# Patient Record
Sex: FEMALE | Race: ASIAN | NOT HISPANIC OR LATINO
[De-identification: names, ages, dates, MRNs, and addresses within clinical notes are randomized per-mention and may not be internally consistent; named-entity substitution may affect disease eponyms.]

---

## 2017-01-23 ENCOUNTER — RESULT REVIEW (OUTPATIENT)
Age: 57
End: 2017-01-23

## 2017-05-15 ENCOUNTER — OTHER (OUTPATIENT)
Age: 57
End: 2017-05-15

## 2017-06-28 ENCOUNTER — FORM ENCOUNTER (OUTPATIENT)
Age: 57
End: 2017-06-28

## 2017-06-29 ENCOUNTER — APPOINTMENT (OUTPATIENT)
Dept: ULTRASOUND IMAGING | Facility: CLINIC | Age: 57
End: 2017-06-29

## 2017-06-29 ENCOUNTER — OUTPATIENT (OUTPATIENT)
Dept: OUTPATIENT SERVICES | Facility: HOSPITAL | Age: 57
LOS: 1 days | End: 2017-06-29
Payer: COMMERCIAL

## 2017-06-29 ENCOUNTER — APPOINTMENT (OUTPATIENT)
Dept: MAMMOGRAPHY | Facility: CLINIC | Age: 57
End: 2017-06-29

## 2017-06-29 DIAGNOSIS — Z85.3 PERSONAL HISTORY OF MALIGNANT NEOPLASM OF BREAST: ICD-10-CM

## 2017-06-29 PROCEDURE — 76641 ULTRASOUND BREAST COMPLETE: CPT

## 2017-06-29 PROCEDURE — 77067 SCR MAMMO BI INCL CAD: CPT

## 2017-06-29 PROCEDURE — 77063 BREAST TOMOSYNTHESIS BI: CPT

## 2017-11-02 ENCOUNTER — OTHER (OUTPATIENT)
Age: 57
End: 2017-11-02

## 2017-11-20 ENCOUNTER — FORM ENCOUNTER (OUTPATIENT)
Age: 57
End: 2017-11-20

## 2017-11-21 ENCOUNTER — OUTPATIENT (OUTPATIENT)
Dept: OUTPATIENT SERVICES | Facility: HOSPITAL | Age: 57
LOS: 1 days | End: 2017-11-21
Payer: COMMERCIAL

## 2017-11-21 ENCOUNTER — APPOINTMENT (OUTPATIENT)
Dept: MRI IMAGING | Facility: CLINIC | Age: 57
End: 2017-11-21
Payer: COMMERCIAL

## 2017-11-21 DIAGNOSIS — Z00.8 ENCOUNTER FOR OTHER GENERAL EXAMINATION: ICD-10-CM

## 2017-11-21 PROCEDURE — A9585: CPT

## 2017-11-21 PROCEDURE — C8908: CPT

## 2017-11-21 PROCEDURE — C8937: CPT

## 2017-11-21 PROCEDURE — 77059 MRI BREAST BILATERAL: CPT | Mod: 26

## 2017-11-21 PROCEDURE — 0159T: CPT | Mod: 26

## 2017-12-12 ENCOUNTER — RESULT REVIEW (OUTPATIENT)
Age: 57
End: 2017-12-12

## 2018-02-12 ENCOUNTER — APPOINTMENT (OUTPATIENT)
Dept: BREAST CENTER | Facility: CLINIC | Age: 58
End: 2018-02-12
Payer: COMMERCIAL

## 2018-02-12 VITALS
DIASTOLIC BLOOD PRESSURE: 70 MMHG | BODY MASS INDEX: 22.08 KG/M2 | HEIGHT: 62 IN | SYSTOLIC BLOOD PRESSURE: 108 MMHG | WEIGHT: 120 LBS | HEART RATE: 68 BPM

## 2018-02-12 DIAGNOSIS — Z13.79 ENCOUNTER FOR OTHER SCREENING FOR GENETIC AND CHROMOSOMAL ANOMALIES: ICD-10-CM

## 2018-02-12 PROCEDURE — 99214 OFFICE O/P EST MOD 30 MIN: CPT

## 2018-02-12 RX ORDER — ASCORBIC ACID 500 MG
TABLET ORAL
Refills: 0 | Status: DISCONTINUED | COMMUNITY

## 2018-07-15 ENCOUNTER — FORM ENCOUNTER (OUTPATIENT)
Age: 58
End: 2018-07-15

## 2018-07-16 ENCOUNTER — APPOINTMENT (OUTPATIENT)
Dept: MAMMOGRAPHY | Facility: CLINIC | Age: 58
End: 2018-07-16
Payer: COMMERCIAL

## 2018-07-16 ENCOUNTER — APPOINTMENT (OUTPATIENT)
Dept: ULTRASOUND IMAGING | Facility: CLINIC | Age: 58
End: 2018-07-16
Payer: COMMERCIAL

## 2018-07-16 ENCOUNTER — OUTPATIENT (OUTPATIENT)
Dept: OUTPATIENT SERVICES | Facility: HOSPITAL | Age: 58
LOS: 1 days | End: 2018-07-16
Payer: COMMERCIAL

## 2018-07-16 DIAGNOSIS — Z00.8 ENCOUNTER FOR OTHER GENERAL EXAMINATION: ICD-10-CM

## 2018-07-16 PROCEDURE — 77067 SCR MAMMO BI INCL CAD: CPT

## 2018-07-16 PROCEDURE — 76641 ULTRASOUND BREAST COMPLETE: CPT | Mod: 26,50

## 2018-07-16 PROCEDURE — 76641 ULTRASOUND BREAST COMPLETE: CPT

## 2018-07-16 PROCEDURE — 77063 BREAST TOMOSYNTHESIS BI: CPT | Mod: 26

## 2018-07-16 PROCEDURE — 77063 BREAST TOMOSYNTHESIS BI: CPT

## 2018-07-16 PROCEDURE — 77067 SCR MAMMO BI INCL CAD: CPT | Mod: 26

## 2018-11-25 ENCOUNTER — FORM ENCOUNTER (OUTPATIENT)
Age: 58
End: 2018-11-25

## 2018-11-26 ENCOUNTER — APPOINTMENT (OUTPATIENT)
Dept: MRI IMAGING | Facility: CLINIC | Age: 58
End: 2018-11-26
Payer: COMMERCIAL

## 2018-11-26 ENCOUNTER — OUTPATIENT (OUTPATIENT)
Dept: OUTPATIENT SERVICES | Facility: HOSPITAL | Age: 58
LOS: 1 days | End: 2018-11-26
Payer: COMMERCIAL

## 2018-11-26 DIAGNOSIS — Z00.8 ENCOUNTER FOR OTHER GENERAL EXAMINATION: ICD-10-CM

## 2018-11-26 PROCEDURE — C8908: CPT

## 2018-11-26 PROCEDURE — C8937: CPT

## 2018-11-26 PROCEDURE — A9585: CPT

## 2018-11-26 PROCEDURE — 77059 MRI BREAST BILATERAL: CPT | Mod: 26

## 2018-11-26 PROCEDURE — 82565 ASSAY OF CREATININE: CPT

## 2018-11-26 PROCEDURE — 0159T: CPT | Mod: 26

## 2019-04-25 NOTE — PAST MEDICAL HISTORY
[Postmenopausal] : The patient is postmenopausal [Menarche Age ____] : age at menarche was [unfilled] [Menopause Age____] : age at menopause was [unfilled] [Total Preg ___] : G[unfilled] [Live Births ___] : P[unfilled]

## 2019-04-29 ENCOUNTER — APPOINTMENT (OUTPATIENT)
Dept: BREAST CENTER | Facility: CLINIC | Age: 59
End: 2019-04-29
Payer: COMMERCIAL

## 2019-04-29 VITALS
SYSTOLIC BLOOD PRESSURE: 138 MMHG | HEIGHT: 61.5 IN | DIASTOLIC BLOOD PRESSURE: 81 MMHG | WEIGHT: 118 LBS | HEART RATE: 68 BPM | BODY MASS INDEX: 21.99 KG/M2

## 2019-04-29 DIAGNOSIS — D05.10 INTRADUCTAL CARCINOMA IN SITU OF UNSPECIFIED BREAST: ICD-10-CM

## 2019-04-29 DIAGNOSIS — Z85.3 PERSONAL HISTORY OF MALIGNANT NEOPLASM OF BREAST: ICD-10-CM

## 2019-04-29 DIAGNOSIS — Z92.89 PERSONAL HISTORY OF OTHER MEDICAL TREATMENT: ICD-10-CM

## 2019-04-29 PROCEDURE — 99214 OFFICE O/P EST MOD 30 MIN: CPT

## 2019-04-29 RX ORDER — BIOTIN 10 MG
TABLET ORAL
Refills: 0 | Status: DISCONTINUED | COMMUNITY
End: 2019-04-29

## 2019-04-29 NOTE — PHYSICAL EXAM
[Atraumatic] : atraumatic [Normocephalic] : normocephalic [EOMI] : extra ocular movement intact [Supple] : supple [Sclera nonicteric] : sclera nonicteric [No Supraclavicular Adenopathy] : no supraclavicular adenopathy [No Cervical Adenopathy] : no cervical adenopathy [Clear to Auscultation Bilat] : clear to auscultation bilaterally [Normal Sinus Rhythm] : normal sinus rhythm [No Rubs] : no pericardial rub [Examined in the supine and seated position] : examined in the supine and seated position [Symmetrical] : symmetrical [No dominant masses] : no dominant masses left breast [No dominant masses] : no dominant masses in right breast  [No Nipple Retraction] : no left nipple retraction [No Nipple Discharge] : no right nipple discharge [No Axillary Lymphadenopathy] : no left axillary lymphadenopathy [Not Tender] : non-tender [Soft] : abdomen soft [No Edema] : no edema [No Ulceration] : no ulceration [No Rashes] : no rashes [de-identified] : Circumareolar scar 12:00.

## 2019-04-29 NOTE — DATA REVIEWED
[FreeTextEntry1] : Core needle bx 12/21/2005\par - R breast 1:00 = fibrocystic changes\par \par Core needle bx 7/6/2006\par - R breast 9:00 = fibrocystic changes\par \par Surg path 1/20/2011\par - L lumpectomy = no residual DCIS\par [needle biopsy report of being moderate-high grade, ER >95%, ID 35-40%]\par \par B/l mammogram and US 7/16/2018\par - heterogenously dense\par - stable post-lumpectomy changes in L breast\par - BIRADS 2\par \par Breast MRI 11/26/2018\par - stable post-lumpectomy changes in L breast\par - no lymphadenopathy\par - BIRADS 2

## 2019-04-29 NOTE — CONSULT LETTER
[Dear  ___] : Dear  [unfilled], [Courtesy Letter:] : I had the pleasure of seeing your patient, [unfilled], in my office today. [Please see my note below.] : Please see my note below. [Consult Closing:] : Thank you very much for allowing me to participate in the care of this patient.  If you have any questions, please do not hesitate to contact me. [Sincerely,] : Sincerely, [FreeTextEntry3] : Hina Kauffman MD

## 2019-04-29 NOTE — HISTORY OF PRESENT ILLNESS
[FreeTextEntry1] : Ms. Davis is a 59 year old woman here for left breast pain and for surveillance for breast cancer. Her breast history is significant for :\par \par 1. Left breast DCIS diagnosed in 2011 at age 50, stage 0, ER >95%, NE 35-40%\par - found as microcalcifications on mammogram and enhancement on MRI\par - s/p left lumpectomy (Dr. Collier, 2011)\par - s/p radiation\par - declined tamoxifen \par \par The pain is in the left lateral breast, intermittent, does not last long, and resolves spontaneously. No redness or drainage. No pain today. She has been very stressed with work recently. No palpable breast or axillary lumps, nipple discharge, or skin changes. Her mammogram and ultrasound in July and breast MRI in November were benign.\par \par Her panel testing is negative for any mutations. Her family history is significant for breast cancer in her sister at age 49 ( from it at age 67), a maternal aunt in her 40-50's, and a maternal cousin in her 40's.

## 2019-07-17 ENCOUNTER — FORM ENCOUNTER (OUTPATIENT)
Age: 59
End: 2019-07-17

## 2019-07-18 ENCOUNTER — OUTPATIENT (OUTPATIENT)
Dept: OUTPATIENT SERVICES | Facility: HOSPITAL | Age: 59
LOS: 1 days | End: 2019-07-18
Payer: COMMERCIAL

## 2019-07-18 ENCOUNTER — APPOINTMENT (OUTPATIENT)
Dept: MAMMOGRAPHY | Facility: CLINIC | Age: 59
End: 2019-07-18
Payer: COMMERCIAL

## 2019-07-18 ENCOUNTER — APPOINTMENT (OUTPATIENT)
Dept: ULTRASOUND IMAGING | Facility: CLINIC | Age: 59
End: 2019-07-18
Payer: COMMERCIAL

## 2019-07-18 DIAGNOSIS — Z00.8 ENCOUNTER FOR OTHER GENERAL EXAMINATION: ICD-10-CM

## 2019-07-18 PROCEDURE — 77062 BREAST TOMOSYNTHESIS BI: CPT | Mod: 26

## 2019-07-18 PROCEDURE — 77066 DX MAMMO INCL CAD BI: CPT | Mod: 26

## 2019-07-18 PROCEDURE — 77067 SCR MAMMO BI INCL CAD: CPT

## 2019-07-18 PROCEDURE — G0279: CPT

## 2019-07-18 PROCEDURE — 77066 DX MAMMO INCL CAD BI: CPT

## 2019-07-18 PROCEDURE — 76641 ULTRASOUND BREAST COMPLETE: CPT | Mod: 26,50

## 2019-07-18 PROCEDURE — 77063 BREAST TOMOSYNTHESIS BI: CPT

## 2019-07-18 PROCEDURE — 76641 ULTRASOUND BREAST COMPLETE: CPT

## 2019-12-25 ENCOUNTER — FORM ENCOUNTER (OUTPATIENT)
Age: 59
End: 2019-12-25

## 2019-12-26 ENCOUNTER — APPOINTMENT (OUTPATIENT)
Dept: MRI IMAGING | Facility: CLINIC | Age: 59
End: 2019-12-26
Payer: COMMERCIAL

## 2019-12-26 ENCOUNTER — OUTPATIENT (OUTPATIENT)
Dept: OUTPATIENT SERVICES | Facility: HOSPITAL | Age: 59
LOS: 1 days | End: 2019-12-26
Payer: COMMERCIAL

## 2019-12-26 DIAGNOSIS — Z80.3 FAMILY HISTORY OF MALIGNANT NEOPLASM OF BREAST: ICD-10-CM

## 2019-12-26 DIAGNOSIS — R92.2 INCONCLUSIVE MAMMOGRAM: ICD-10-CM

## 2019-12-26 DIAGNOSIS — Z85.3 PERSONAL HISTORY OF MALIGNANT NEOPLASM OF BREAST: ICD-10-CM

## 2019-12-26 PROCEDURE — C8937: CPT

## 2019-12-26 PROCEDURE — A9585: CPT

## 2019-12-26 PROCEDURE — C8908: CPT

## 2019-12-26 PROCEDURE — 77049 MRI BREAST C-+ W/CAD BI: CPT | Mod: 26

## 2020-02-27 ENCOUNTER — APPOINTMENT (OUTPATIENT)
Dept: BREAST CENTER | Facility: CLINIC | Age: 60
End: 2020-02-27
Payer: COMMERCIAL

## 2020-02-27 VITALS
HEIGHT: 61.5 IN | HEART RATE: 71 BPM | WEIGHT: 120 LBS | DIASTOLIC BLOOD PRESSURE: 80 MMHG | BODY MASS INDEX: 22.37 KG/M2 | SYSTOLIC BLOOD PRESSURE: 125 MMHG

## 2020-02-27 DIAGNOSIS — R92.2 INCONCLUSIVE MAMMOGRAM: ICD-10-CM

## 2020-02-27 DIAGNOSIS — N64.4 MASTODYNIA: ICD-10-CM

## 2020-02-27 PROCEDURE — 99214 OFFICE O/P EST MOD 30 MIN: CPT

## 2020-02-27 NOTE — PHYSICAL EXAM
[Normocephalic] : normocephalic [EOMI] : extra ocular movement intact [Atraumatic] : atraumatic [Sclera nonicteric] : sclera nonicteric [Supple] : supple [No Supraclavicular Adenopathy] : no supraclavicular adenopathy [No Cervical Adenopathy] : no cervical adenopathy [Clear to Auscultation Bilat] : clear to auscultation bilaterally [Normal Sinus Rhythm] : normal sinus rhythm [Examined in the supine and seated position] : examined in the supine and seated position [No Rubs] : no pericardial rub [Symmetrical] : symmetrical [No dominant masses] : no dominant masses in right breast  [No Nipple Retraction] : no left nipple retraction [No dominant masses] : no dominant masses left breast [No Nipple Discharge] : no left nipple discharge [No Axillary Lymphadenopathy] : no left axillary lymphadenopathy [Not Tender] : non-tender [Soft] : abdomen soft [No Edema] : no edema [No Rashes] : no rashes [No Ulceration] : no ulceration [de-identified] : Periareolar scar at 12:00

## 2020-02-27 NOTE — HISTORY OF PRESENT ILLNESS
[FreeTextEntry1] : Ms. Davis is a 59 year old woman here for a follow-up for left breast pain and for surveillance for breast cancer. Her breast history is significant for :\par \par 1. Left breast DCIS diagnosed in 2011 at age 50, stage 0, ER >95%, MD 35-40%\par - found as microcalcifications on mammogram and enhancement on MRI\par - s/p left lumpectomy (Dr. Collier, 2011)\par - s/p radiation\par - declined tamoxifen \par \par She has the same intermittent left lateral breast pain which seems to occur more around the time of her follow-up visit. No redness or drainage. No palpable breast or axillary lumps, nipple discharge, or skin changes.\par \par Her panel testing is negative for any mutations. Her family history is significant for breast cancer in her sister at age 49 ( from it at age 67), a maternal aunt in her 40-50's, and a maternal cousin in her 40's.

## 2020-02-27 NOTE — DATA REVIEWED
[FreeTextEntry1] : Core needle bx 12/21/2005\par - R breast 1:00 = fibrocystic changes\par \par Core needle bx 7/6/2006\par - R breast 9:00 = fibrocystic changes\par \par Surg path 1/20/2011\par - L lumpectomy = no residual DCIS\par [needle biopsy report of being moderate-high grade, ER >95%, LA 35-40%]\par \par B/l mammogram and US 7/18/2019\par - heterogenously dense\par - stable post-lumpectomy changes in L breast\par - BIRADS 2\par \par Breast MRI 12/26/2019\par - stable post-lumpectomy changes in L breast\par - no lymphadenopathy\par - BIRADS 2

## 2020-08-10 ENCOUNTER — RESULT REVIEW (OUTPATIENT)
Age: 60
End: 2020-08-10

## 2020-08-10 ENCOUNTER — OUTPATIENT (OUTPATIENT)
Dept: OUTPATIENT SERVICES | Facility: HOSPITAL | Age: 60
LOS: 1 days | End: 2020-08-10
Payer: COMMERCIAL

## 2020-08-10 ENCOUNTER — APPOINTMENT (OUTPATIENT)
Dept: MAMMOGRAPHY | Facility: CLINIC | Age: 60
End: 2020-08-10
Payer: COMMERCIAL

## 2020-08-10 ENCOUNTER — APPOINTMENT (OUTPATIENT)
Dept: ULTRASOUND IMAGING | Facility: CLINIC | Age: 60
End: 2020-08-10
Payer: COMMERCIAL

## 2020-08-10 DIAGNOSIS — R92.2 INCONCLUSIVE MAMMOGRAM: ICD-10-CM

## 2020-08-10 DIAGNOSIS — Z08 ENCOUNTER FOR FOLLOW-UP EXAMINATION AFTER COMPLETED TREATMENT FOR MALIGNANT NEOPLASM: ICD-10-CM

## 2020-08-10 PROCEDURE — 76641 ULTRASOUND BREAST COMPLETE: CPT

## 2020-08-10 PROCEDURE — G0279: CPT

## 2020-08-10 PROCEDURE — 76641 ULTRASOUND BREAST COMPLETE: CPT | Mod: 26,50

## 2020-08-10 PROCEDURE — 77062 BREAST TOMOSYNTHESIS BI: CPT | Mod: 26

## 2020-08-10 PROCEDURE — 77066 DX MAMMO INCL CAD BI: CPT

## 2020-08-10 PROCEDURE — 77066 DX MAMMO INCL CAD BI: CPT | Mod: 26

## 2020-12-18 ENCOUNTER — APPOINTMENT (OUTPATIENT)
Dept: MRI IMAGING | Facility: CLINIC | Age: 60
End: 2020-12-18
Payer: COMMERCIAL

## 2020-12-18 ENCOUNTER — RESULT REVIEW (OUTPATIENT)
Age: 60
End: 2020-12-18

## 2020-12-18 ENCOUNTER — OUTPATIENT (OUTPATIENT)
Dept: OUTPATIENT SERVICES | Facility: HOSPITAL | Age: 60
LOS: 1 days | End: 2020-12-18
Payer: COMMERCIAL

## 2020-12-18 DIAGNOSIS — R92.2 INCONCLUSIVE MAMMOGRAM: ICD-10-CM

## 2020-12-18 DIAGNOSIS — Z85.3 PERSONAL HISTORY OF MALIGNANT NEOPLASM OF BREAST: ICD-10-CM

## 2020-12-18 PROCEDURE — A9585: CPT

## 2020-12-18 PROCEDURE — C8937: CPT

## 2020-12-18 PROCEDURE — 77049 MRI BREAST C-+ W/CAD BI: CPT | Mod: 26

## 2020-12-18 PROCEDURE — C8908: CPT

## 2021-08-13 PROBLEM — Z00.00 ENCOUNTER FOR PREVENTIVE HEALTH EXAMINATION: Noted: 2021-08-13

## 2021-09-06 ENCOUNTER — APPOINTMENT (OUTPATIENT)
Dept: MAMMOGRAPHY | Facility: CLINIC | Age: 61
End: 2021-09-06
Payer: COMMERCIAL

## 2021-09-06 ENCOUNTER — RESULT REVIEW (OUTPATIENT)
Age: 61
End: 2021-09-06

## 2021-09-06 ENCOUNTER — OUTPATIENT (OUTPATIENT)
Dept: OUTPATIENT SERVICES | Facility: HOSPITAL | Age: 61
LOS: 1 days | End: 2021-09-06
Payer: COMMERCIAL

## 2021-09-06 ENCOUNTER — APPOINTMENT (OUTPATIENT)
Dept: ULTRASOUND IMAGING | Facility: CLINIC | Age: 61
End: 2021-09-06
Payer: COMMERCIAL

## 2021-09-06 DIAGNOSIS — Z08 ENCOUNTER FOR FOLLOW-UP EXAMINATION AFTER COMPLETED TREATMENT FOR MALIGNANT NEOPLASM: ICD-10-CM

## 2021-09-06 DIAGNOSIS — Z00.8 ENCOUNTER FOR OTHER GENERAL EXAMINATION: ICD-10-CM

## 2021-09-06 PROCEDURE — 76641 ULTRASOUND BREAST COMPLETE: CPT

## 2021-09-06 PROCEDURE — 77063 BREAST TOMOSYNTHESIS BI: CPT | Mod: 26

## 2021-09-06 PROCEDURE — 77067 SCR MAMMO BI INCL CAD: CPT | Mod: 26

## 2021-09-06 PROCEDURE — 77063 BREAST TOMOSYNTHESIS BI: CPT

## 2021-09-06 PROCEDURE — 76641 ULTRASOUND BREAST COMPLETE: CPT | Mod: 26,50

## 2021-09-06 PROCEDURE — 77067 SCR MAMMO BI INCL CAD: CPT

## 2021-10-04 ENCOUNTER — APPOINTMENT (OUTPATIENT)
Dept: BREAST CENTER | Facility: CLINIC | Age: 61
End: 2021-10-04
Payer: COMMERCIAL

## 2021-10-04 VITALS
SYSTOLIC BLOOD PRESSURE: 125 MMHG | BODY MASS INDEX: 24.23 KG/M2 | HEART RATE: 75 BPM | DIASTOLIC BLOOD PRESSURE: 80 MMHG | HEIGHT: 61.5 IN | WEIGHT: 130 LBS

## 2021-10-04 PROCEDURE — 99214 OFFICE O/P EST MOD 30 MIN: CPT

## 2021-10-04 RX ORDER — SIMVASTATIN 10 MG/1
10 TABLET, FILM COATED ORAL
Refills: 0 | Status: DISCONTINUED | COMMUNITY
End: 2021-10-04

## 2021-10-04 NOTE — HISTORY OF PRESENT ILLNESS
[FreeTextEntry1] : Ms. Davis is a 61 year old woman here for a follow-up for surveillance for breast cancer. Her breast history is significant for :\par \par 1. Left breast DCIS diagnosed in 2011 at age 50, pathologic stage 0, pTis, ER >95%, HI 35-40%\par - found as microcalcifications on mammogram and enhancement on MRI\par - s/p left lumpectomy (Dr. Collier, 2011)\par - s/p radiation\par - declined tamoxifen \par \par She has no complaints. No palpable breast or axillary lumps, nipple discharge, or skin changes.\par \par Her panel testing is negative for any mutations. Her family history is significant for breast cancer in her sister at age 49 ( from it at age 67), a maternal aunt in her 40-50's, and a maternal cousin in her 40's.

## 2021-10-04 NOTE — CONSULT LETTER
[Dear  ___] : Dear  [unfilled], [Courtesy Letter:] : I had the pleasure of seeing your patient, [unfilled], in my office today. [Please see my note below.] : Please see my note below. [Consult Closing:] : Thank you very much for allowing me to participate in the care of this patient.  If you have any questions, please do not hesitate to contact me. [Sincerely,] : Sincerely, [FreeTextEntry3] : Hina Kauffman MD FACS

## 2021-10-04 NOTE — PHYSICAL EXAM
[Normocephalic] : normocephalic [Atraumatic] : atraumatic [EOMI] : extra ocular movement intact [Sclera nonicteric] : sclera nonicteric [Supple] : supple [No Supraclavicular Adenopathy] : no supraclavicular adenopathy [No Cervical Adenopathy] : no cervical adenopathy [Clear to Auscultation Bilat] : clear to auscultation bilaterally [Normal Sinus Rhythm] : normal sinus rhythm [No Rubs] : no pericardial rub [Examined in the supine and seated position] : examined in the supine and seated position [Symmetrical] : symmetrical [No dominant masses] : no dominant masses in right breast  [No dominant masses] : no dominant masses left breast [No Nipple Retraction] : no left nipple retraction [No Nipple Discharge] : no left nipple discharge [No Axillary Lymphadenopathy] : no left axillary lymphadenopathy [Soft] : abdomen soft [Not Tender] : non-tender [No Edema] : no edema [No Rashes] : no rashes [No Ulceration] : no ulceration [de-identified] : Periareolar scar at 12:00

## 2021-10-04 NOTE — DATA REVIEWED
[FreeTextEntry1] : Core needle bx 12/21/2005\par - R breast 1:00 = fibrocystic changes\par \par Core needle bx 7/6/2006\par - R breast 9:00 = fibrocystic changes\par \par Surg path 1/20/2011\par - L lumpectomy = no residual DCIS\par [needle biopsy report of being moderate-high grade, ER >95%, MA 35-40%]\par \par Breast MRI 12/18/20\par - no MRI evidence of malignancy\par - BIRADS 2\par \par B/l mammogram and US 9/6/21\par - heterogenously dense\par - BIRADS 2

## 2022-04-15 ENCOUNTER — APPOINTMENT (OUTPATIENT)
Dept: MRI IMAGING | Facility: CLINIC | Age: 62
End: 2022-04-15
Payer: COMMERCIAL

## 2022-04-15 ENCOUNTER — OUTPATIENT (OUTPATIENT)
Dept: OUTPATIENT SERVICES | Facility: HOSPITAL | Age: 62
LOS: 1 days | End: 2022-04-15
Payer: COMMERCIAL

## 2022-04-15 DIAGNOSIS — Z91.89 OTHER SPECIFIED PERSONAL RISK FACTORS, NOT ELSEWHERE CLASSIFIED: ICD-10-CM

## 2022-04-15 DIAGNOSIS — Z00.8 ENCOUNTER FOR OTHER GENERAL EXAMINATION: ICD-10-CM

## 2022-04-15 PROCEDURE — A9585: CPT

## 2022-04-15 PROCEDURE — 77049 MRI BREAST C-+ W/CAD BI: CPT | Mod: 26

## 2022-04-15 PROCEDURE — C8937: CPT

## 2022-04-15 PROCEDURE — C8908: CPT

## 2022-09-15 DIAGNOSIS — Z12.39 ENCOUNTER FOR OTHER SCREENING FOR MALIGNANT NEOPLASM OF BREAST: ICD-10-CM

## 2022-10-18 ENCOUNTER — RESULT REVIEW (OUTPATIENT)
Age: 62
End: 2022-10-18

## 2022-10-18 ENCOUNTER — APPOINTMENT (OUTPATIENT)
Dept: ULTRASOUND IMAGING | Facility: CLINIC | Age: 62
End: 2022-10-18

## 2022-10-18 ENCOUNTER — OUTPATIENT (OUTPATIENT)
Dept: OUTPATIENT SERVICES | Facility: HOSPITAL | Age: 62
LOS: 1 days | End: 2022-10-18
Payer: COMMERCIAL

## 2022-10-18 ENCOUNTER — APPOINTMENT (OUTPATIENT)
Dept: MAMMOGRAPHY | Facility: CLINIC | Age: 62
End: 2022-10-18

## 2022-10-18 DIAGNOSIS — Z85.3 PERSONAL HISTORY OF MALIGNANT NEOPLASM OF BREAST: ICD-10-CM

## 2022-10-18 DIAGNOSIS — Z00.8 ENCOUNTER FOR OTHER GENERAL EXAMINATION: ICD-10-CM

## 2022-10-18 DIAGNOSIS — Z12.39 ENCOUNTER FOR OTHER SCREENING FOR MALIGNANT NEOPLASM OF BREAST: ICD-10-CM

## 2022-10-18 PROCEDURE — 76641 ULTRASOUND BREAST COMPLETE: CPT | Mod: 26,50

## 2022-10-18 PROCEDURE — 77067 SCR MAMMO BI INCL CAD: CPT | Mod: 26

## 2022-10-18 PROCEDURE — 77063 BREAST TOMOSYNTHESIS BI: CPT | Mod: 26

## 2022-10-18 PROCEDURE — 76641 ULTRASOUND BREAST COMPLETE: CPT

## 2022-10-18 PROCEDURE — 77067 SCR MAMMO BI INCL CAD: CPT

## 2022-10-18 PROCEDURE — 77063 BREAST TOMOSYNTHESIS BI: CPT

## 2023-01-30 ENCOUNTER — APPOINTMENT (OUTPATIENT)
Dept: BREAST CENTER | Facility: CLINIC | Age: 63
End: 2023-01-30
Payer: COMMERCIAL

## 2023-01-30 VITALS
BODY MASS INDEX: 22.66 KG/M2 | HEIGHT: 61 IN | DIASTOLIC BLOOD PRESSURE: 84 MMHG | WEIGHT: 120 LBS | SYSTOLIC BLOOD PRESSURE: 135 MMHG | HEART RATE: 67 BPM

## 2023-01-30 DIAGNOSIS — Z86.000 ENCOUNTER FOR FOLLOW-UP EXAMINATION AFTER COMPLETED TREATMENT FOR MALIGNANT NEOPLASM: ICD-10-CM

## 2023-01-30 DIAGNOSIS — Z08 ENCOUNTER FOR FOLLOW-UP EXAMINATION AFTER COMPLETED TREATMENT FOR MALIGNANT NEOPLASM: ICD-10-CM

## 2023-01-30 DIAGNOSIS — Z91.89 OTHER SPECIFIED PERSONAL RISK FACTORS, NOT ELSEWHERE CLASSIFIED: ICD-10-CM

## 2023-01-30 PROCEDURE — 99214 OFFICE O/P EST MOD 30 MIN: CPT

## 2023-01-30 NOTE — HISTORY OF PRESENT ILLNESS
[FreeTextEntry1] : Ms. Davis is a 62 year old woman here for a follow-up for surveillance for breast cancer. Her breast history is significant for:\par \par 1. Left breast DCIS diagnosed in 2011 at age 50, pathologic stage 0, pTis, ER >95%, PA 35-40%\par - found as microcalcifications on mammogram and enhancement on MRI\par - s/p left lumpectomy (Dr. Collier, 2011)\par - s/p radiation\par - declined tamoxifen \par \par She is doing well. No palpable breast or axillary lumps, nipple discharge, or skin changes.\par \par Her panel testing is negative for any mutations. Her family history is significant for breast cancer in her sister at age 49 ( from it at age 67), a maternal aunt in her 40-50's, and a maternal cousin in her 40's.

## 2023-01-30 NOTE — PHYSICAL EXAM
[Normocephalic] : normocephalic [Atraumatic] : atraumatic [Sclera nonicteric] : sclera nonicteric [Supple] : supple [No Supraclavicular Adenopathy] : no supraclavicular adenopathy [No Cervical Adenopathy] : no cervical adenopathy [Clear to Auscultation Bilat] : clear to auscultation bilaterally [Normal Sinus Rhythm] : normal sinus rhythm [No Rubs] : no pericardial rub [Examined in the supine and seated position] : examined in the supine and seated position [Symmetrical] : symmetrical [No dominant masses] : no dominant masses in right breast  [No dominant masses] : no dominant masses left breast [No Nipple Retraction] : no left nipple retraction [No Nipple Discharge] : no left nipple discharge [No Axillary Lymphadenopathy] : no left axillary lymphadenopathy [Soft] : abdomen soft [Not Tender] : non-tender [No Edema] : no edema [No Rashes] : no rashes [No Ulceration] : no ulceration [de-identified] : Periareolar scar at 12:00

## 2023-01-30 NOTE — DATA REVIEWED
[FreeTextEntry1] : Core needle bx 12/21/2005\par - R breast 1:00 = fibrocystic changes\par \par Core needle bx 7/6/2006\par - R breast 9:00 = fibrocystic changes\par \par Surg path 1/20/2011\par - L lumpectomy = no residual DCIS\par [needle biopsy report of being moderate-high grade, ER >95%, TN 35-40%]\par \par B/l mammogram and US 10/18/22\par - heterogenously dense\par - BIRADS 2